# Patient Record
Sex: FEMALE | Race: WHITE | NOT HISPANIC OR LATINO | URBAN - METROPOLITAN AREA
[De-identification: names, ages, dates, MRNs, and addresses within clinical notes are randomized per-mention and may not be internally consistent; named-entity substitution may affect disease eponyms.]

---

## 2023-01-07 ENCOUNTER — EMERGENCY (EMERGENCY)
Facility: HOSPITAL | Age: 23
LOS: 1 days | Discharge: HOME | End: 2023-01-07
Attending: EMERGENCY MEDICINE | Admitting: EMERGENCY MEDICINE
Payer: COMMERCIAL

## 2023-01-07 VITALS
WEIGHT: 128.09 LBS | TEMPERATURE: 98 F | SYSTOLIC BLOOD PRESSURE: 102 MMHG | DIASTOLIC BLOOD PRESSURE: 56 MMHG | RESPIRATION RATE: 16 BRPM | HEART RATE: 76 BPM | OXYGEN SATURATION: 97 %

## 2023-01-07 VITALS
RESPIRATION RATE: 16 BRPM | OXYGEN SATURATION: 99 % | SYSTOLIC BLOOD PRESSURE: 97 MMHG | DIASTOLIC BLOOD PRESSURE: 57 MMHG | HEART RATE: 70 BPM

## 2023-01-07 PROCEDURE — 99285 EMERGENCY DEPT VISIT HI MDM: CPT

## 2023-01-07 PROCEDURE — 82962 GLUCOSE BLOOD TEST: CPT

## 2023-01-07 PROCEDURE — 99284 EMERGENCY DEPT VISIT MOD MDM: CPT

## 2023-01-07 RX ORDER — ONDANSETRON 8 MG/1
4 TABLET, FILM COATED ORAL ONCE
Refills: 0 | Status: COMPLETED | OUTPATIENT
Start: 2023-01-07 | End: 2023-01-07

## 2023-01-07 RX ADMIN — ONDANSETRON 4 MILLIGRAM(S): 8 TABLET, FILM COATED ORAL at 03:33

## 2023-01-07 NOTE — ED ADULT NURSE REASSESSMENT NOTE - NS ED NURSE REASSESS COMMENT FT1
pt. resting quietly, resps. even/unlabored, assessment on-going, awaiting sobriety, friend remains at bedside

## 2023-01-07 NOTE — ED ADULT NURSE NOTE - OBJECTIVE STATEMENT
0 Patient complaints of AMS as per patient's friends. Patient states I drink too much. denies use any drugs.   Complaints of Lt wrist pain, bruise noted, complaints of nausea. As per friend, patient fell on Lt side but denies hit head.  Patient is awake but doesn't answer about the question and keep saying "I'm sorry"  Patient is on hospital gown with red socks, friend is on bedside.

## 2023-01-07 NOTE — ED PROVIDER NOTE - OBJECTIVE STATEMENT
22F no PMH c/o alcohol intoxication. pt states she drank too much tonight. states feeling very nauseous and vomited. states fell on left hand, bruise to left wrist. no head trauma, no LOC. pt right hand dominant. denies any drug use.

## 2023-01-07 NOTE — ED PROVIDER NOTE - PHYSICAL EXAMINATION
left volar wrist - ecchymosis, no bony tenderness, no deformity, no snuffbox tenderness, able to range, 2+radial

## 2023-01-07 NOTE — ED PROVIDER NOTE - NSFOLLOWUPINSTRUCTIONS_ED_ALL_ED_FT
Alcohol Intoxication    Alcohol intoxication happens when you cannot think clearly or function well (get impaired) after drinking alcohol. This can happen after just one drink. The effect that alcohol has on how you think and function depends on:  •How much alcohol you drank.    •Your age, your weight, and whether you are a man or a woman.    •How often you drink alcohol.    •If you have other medical problems.    Alcohol intoxication can range from mild to very bad. It can be dangerous, especially if you:•Drink a large amount of alcohol in a short time (binge drink).  •For women, binge drinking is having four or more drinks at one time.    •For men, binge drinking is having five or more drinks at one time.    •Take certain drugs or medicines.    If you or anyone around you seems intoxicated:  •Tell someone.    •Get help from someone.    Follow these instructions at home    Three cups showing dark yellow, yellow, and pale yellow pee.   •Ask your doctor if alcohol is safe for you.•If your doctor says that alcohol is safe for you, limit how much you drink to no more than 1 drink a day for women who are not pregnant and 2 drinks a day for men. One drink equals one of these:  •12 oz (355 mL) of beer.    •5 oz (148 mL) of wine.    •1½ oz (44 mL) of hard liquor.    •Do not drink alcohol if:  •Your doctor tells you not to drink.    •You are pregnant, may be pregnant, or are planning to get pregnant.    •You are under the legal drinking age (21 years old in the [place]    •You are taking medicines that you should not take with alcohol.    •Alcohol causes your medical problem to get worse.    •You have to drive or do activities that need you to be alert.    •You have substance use disorder. This is when using alcohol again and again causes problems with your health, your relationships, or with what you need to do at work, home, or school.    •Be sure to eat before you drink alcohol. Avoid drinking when you have an empty stomach.    •Make sure you have enough fluid in your body (stay hydrated). To do this:  •Drink enough fluid to keep your pee (urine) pale yellow.    •Avoid caffeine, which may be in coffee, tea, and some sodas. Caffeine can make you thirsty.    •Try not to drink more than one drink an hour.    •If you are having more than one drink, have a drink without alcohol (such as water) between your drinks.    General instructions   A sign showing that a person should not drive.   •Take over-the-counter and prescription medicines only as told by your doctor.    • Do not drive after drinking any amount of alcohol. Plan for a designated  or another way to go home.    •Have someone you trust stay with you while you are intoxicated. You should not be left alone.    •Keep all follow-up visits as told by your doctor. This is important.    Contact a doctor if:    •You do not feel better after a few days.    •You have problems at work, at school, or at home due to drinking.    Get help right away if:  •You have any of the following:•Moderate or very bad trouble with:  •Movement (coordination).    •Talking.    •Memory.    •Paying attention to things.    •Trouble staying awake.    •Being very confused.    •Jerky movements that you cannot control (seizure).    •Light-headedness.    •Fainting.    •Throwing up (vomiting) blood. The blood may be bright red or look like coffee grounds.    •Blood in your poop (stool). The blood may:  •Be bright red.    •Make your poop black and tarry and make it smell bad.    •Feeling shaky when you try to stop drinking.    •Thoughts about hurting yourself or others.    If you ever feel like you may hurt yourself or others, or have thoughts about taking your own life, get help right away. You can go to your nearest emergency department or call:   • Your local emergency services (911 in the [place]     • A suicide crisis helpline, such as the National Suicide Prevention Lifeline at 1-218.606.1023 or 487 in the [place] This is open 24 hours a day.     Summary    •Alcohol intoxication happens when you cannot think clearly or function well (get impaired) after drinking alcohol. This can happen after just one drink.    •If your doctor says that alcohol is safe for you, limit how much you drink to no more than 1 drink a day for women who are not pregnant and 2 drinks a day for men.    •Contact a doctor if you have problems at work, at school, or at home due to drinking.    •Get help right away if you have thoughts about hurting yourself or others.    This information is not intended to replace advice given to you by your health care provider. Make sure you discuss any questions you have with your health care provider.

## 2023-01-07 NOTE — ED ADULT NURSE REASSESSMENT NOTE - NS ED NURSE REASSESS COMMENT FT1
Patient refuse to do x-ray because of the cost and states "I can't afford it. I don't have money."  MD Scott aware.

## 2023-01-07 NOTE — ED ADULT NURSE NOTE - INTERVENTIONS DEFINITIONS
Physically safe environment: no spills, clutter or unnecessary equipment/Stretcher in lowest position, wheels locked, appropriate side rails in place/Monitor gait and stability/Monitor for mental status changes and reorient to person, place, and time/Review medications for side effects contributing to fall risk/Reinforce activity limits and safety measures with patient and family/Provide visual clues: red socks

## 2023-01-07 NOTE — ED PROVIDER NOTE - PROGRESS NOTE DETAILS
aox3, steady gait, fluent speech, no vomiting in ED. alerted to dangers of excessive alcohol ingestion  I have discussed the discharge plan with the patient. The patient agrees with the plan, as discussed.  The patient understands Emergency Department diagnosis is a preliminary diagnosis often based on limited information and that the patient must adhere to the follow-up plan as discussed.  The patient understands that if the symptoms worsen the patient may return to the Emergency Department at any time for further evaluation and treatment.

## 2023-01-07 NOTE — ED PROVIDER NOTE - CLINICAL SUMMARY MEDICAL DECISION MAKING FREE TEXT BOX
alcohol intox, no evidence of head injury, bruise to left inner wrist. shared decision making - pt does not wish to do xray at this time.   no need for labs at this time  -zofran  -reassess when clinically sober

## 2023-01-07 NOTE — ED ADULT NURSE NOTE - CAS TRG GENERAL AIRWAY, MLM
Hypercoagulable state  - continue with warfarin --> warfarin 5mg today since INR therapeutic  - monitor INR Patent

## 2023-01-10 DIAGNOSIS — S60.212A CONTUSION OF LEFT WRIST, INITIAL ENCOUNTER: ICD-10-CM

## 2023-01-10 DIAGNOSIS — R41.82 ALTERED MENTAL STATUS, UNSPECIFIED: ICD-10-CM
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
## 2023-01-10 DIAGNOSIS — F10.129 ALCOHOL ABUSE WITH INTOXICATION, UNSPECIFIED: ICD-10-CM
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
